# Patient Record
Sex: MALE | Race: WHITE | NOT HISPANIC OR LATINO | URBAN - METROPOLITAN AREA
[De-identification: names, ages, dates, MRNs, and addresses within clinical notes are randomized per-mention and may not be internally consistent; named-entity substitution may affect disease eponyms.]

---

## 2017-07-03 ENCOUNTER — IMPORTED ENCOUNTER (OUTPATIENT)
Dept: URBAN - METROPOLITAN AREA CLINIC 50 | Facility: CLINIC | Age: 67
End: 2017-07-03

## 2017-07-06 ENCOUNTER — IMPORTED ENCOUNTER (OUTPATIENT)
Dept: URBAN - METROPOLITAN AREA CLINIC 50 | Facility: CLINIC | Age: 67
End: 2017-07-06

## 2018-07-05 ENCOUNTER — IMPORTED ENCOUNTER (OUTPATIENT)
Dept: URBAN - METROPOLITAN AREA CLINIC 50 | Facility: CLINIC | Age: 68
End: 2018-07-05

## 2019-07-18 ENCOUNTER — IMPORTED ENCOUNTER (OUTPATIENT)
Dept: URBAN - METROPOLITAN AREA CLINIC 50 | Facility: CLINIC | Age: 69
End: 2019-07-18

## 2020-07-14 ENCOUNTER — IMPORTED ENCOUNTER (OUTPATIENT)
Dept: URBAN - METROPOLITAN AREA CLINIC 50 | Facility: CLINIC | Age: 70
End: 2020-07-14

## 2021-04-16 ASSESSMENT — VISUAL ACUITY
OD_CC: J2
OS_CC: J1@ 16 IN
OS_CC: J2
OD_SC: 20/20
OD_SC: 20/20
OD_BAT: 20/40
OS_BAT: 20/40
OD_SC: 20/20
OS_OTHER: 20/40. 20/50.
OD_CC: J1@ 16 IN
OD_CC: J2
OS_SC: 20/20
OS_CC: J2
OD_BAT: 20/30
OD_OTHER: 20/30. 20/40.
OD_CC: J1
OS_SC: 20/20
OD_OTHER: 20/40. 20/80.
OD_SC: 20/20
OS_CC: J1
OS_SC: 20/25
OS_BAT: 20/50
OS_OTHER: 20/50. 20/100.
OS_SC: 20/20

## 2021-04-16 ASSESSMENT — TONOMETRY
OD_IOP_MMHG: 16
OS_IOP_MMHG: 16
OD_IOP_MMHG: 16
OS_IOP_MMHG: 18
OS_IOP_MMHG: 16
OD_IOP_MMHG: 16
OS_IOP_MMHG: 17
OD_IOP_MMHG: 15

## 2021-06-24 NOTE — PATIENT DISCUSSION
Pt is bothered and symptomatic. Recommended CE to improve visual function. Discussed procedure, risks, benefits, and lens options. Pt wishes to proceed. Schedule A-scan and consult.

## 2021-06-28 NOTE — PATIENT DISCUSSION
IF patient does not notice improvement with CL OD and is still bothered by vision, patient may consider to proceed with cataract surgery OS to maximize vision.

## 2021-06-28 NOTE — PATIENT DISCUSSION
Recommend records release. Patient was last seen ~3 years ago elsewhere. Will look into crosslinking to see if this may be of benefit to patient.

## 2021-06-28 NOTE — PATIENT DISCUSSION
Patient may consider contact lenses to see if he is able to get best vision possible. Patient is mostly bothered by vision OD. Recommend CL OD first if continues bothersome, patient may consider cataract surgery OS to try to maximize vision.

## 2021-06-28 NOTE — PATIENT DISCUSSION
Hiral Archer spoke to patient over the phone regarding corneal crosslinking. Patient to see Dr. Lan Martin for consideration of crosslinking and can follow up in [place] to fit CL.

## 2021-06-28 NOTE — PATIENT DISCUSSION
Patient advised that corneal condition may limit visual recovery following cataract surgery and a partial thickness corneal transplant may be necessary in the future if corneal edema becomes visually significant.

## 2021-07-16 ENCOUNTER — PREPPED CHART (OUTPATIENT)
Dept: URBAN - METROPOLITAN AREA CLINIC 53 | Facility: CLINIC | Age: 71
End: 2021-07-16

## 2021-07-20 ENCOUNTER — COMPREHENSIVE EXAM (OUTPATIENT)
Dept: URBAN - METROPOLITAN AREA CLINIC 53 | Facility: CLINIC | Age: 71
End: 2021-07-20

## 2021-07-20 DIAGNOSIS — H35.373: ICD-10-CM

## 2021-07-20 DIAGNOSIS — H25.13: ICD-10-CM

## 2021-07-20 DIAGNOSIS — E11.9: ICD-10-CM

## 2021-07-20 PROCEDURE — 92014 COMPRE OPH EXAM EST PT 1/>: CPT

## 2021-07-20 PROCEDURE — 92134 CPTRZ OPH DX IMG PST SGM RTA: CPT

## 2021-07-20 ASSESSMENT — VISUAL ACUITY
OD_GLARE: 20/30
OS_GLARE: 20/40
OD_GLARE: 20/30
OS_GLARE: 20/50
OD_SC: 20/20
OU_SC: 20/20
OS_SC: 20/20

## 2021-07-20 ASSESSMENT — TONOMETRY
OS_IOP_MMHG: 16
OD_IOP_MMHG: 16

## 2021-07-27 NOTE — PATIENT DISCUSSION
Kandis Christopher spoke to patient over the phone regarding corneal crosslinking. Patient to see Dr. Margaret Mclean for consideration of crosslinking and can follow up in [place] to fit CL.

## 2022-08-16 ENCOUNTER — COMPREHENSIVE EXAM (OUTPATIENT)
Dept: URBAN - METROPOLITAN AREA CLINIC 53 | Facility: CLINIC | Age: 72
End: 2022-08-16

## 2022-08-16 DIAGNOSIS — H04.123: ICD-10-CM

## 2022-08-16 DIAGNOSIS — H35.373: ICD-10-CM

## 2022-08-16 DIAGNOSIS — E11.9: ICD-10-CM

## 2022-08-16 DIAGNOSIS — H25.13: ICD-10-CM

## 2022-08-16 PROCEDURE — 92134 CPTRZ OPH DX IMG PST SGM RTA: CPT

## 2022-08-16 PROCEDURE — 92014 COMPRE OPH EXAM EST PT 1/>: CPT

## 2022-08-16 ASSESSMENT — VISUAL ACUITY
OU_SC: 20/25+2
OS_GLARE: 20/60
OS_PH: 20/25+2
OS_GLARE: 20/40
OD_GLARE: 20/50
OD_SC: 20/25-2
OD_GLARE: 20/30
OS_SC: 20/40+2

## 2022-08-16 ASSESSMENT — TONOMETRY
OD_IOP_MMHG: 13
OS_IOP_MMHG: 13

## 2022-11-15 NOTE — PATIENT DISCUSSION
MAC OCT performed .  Retinal tear and detachment warning symptoms reviewed and patient instructed to call immediately if increasing floaters, flashes, or decreasing peripheral vision.

## 2022-11-15 NOTE — PATIENT DISCUSSION
Pellucid marginal degeneration OD , Topography performed today shows more progression in astigmatism.

## 2022-11-15 NOTE — PATIENT DISCUSSION
Recommend corneal consult with Dr. Jessee Caruso . Will look into crosslinking to see if this may be of benefit to patient.

## 2023-01-30 ENCOUNTER — PRE-OP/H&P (OUTPATIENT)
Dept: URBAN - METROPOLITAN AREA CLINIC 53 | Facility: CLINIC | Age: 73
End: 2023-01-30

## 2023-01-30 DIAGNOSIS — H25.13: ICD-10-CM

## 2023-01-30 DIAGNOSIS — H35.373: ICD-10-CM

## 2023-01-30 PROCEDURE — 92134 CPTRZ OPH DX IMG PST SGM RTA: CPT

## 2023-01-30 PROCEDURE — 92136 OPHTHALMIC BIOMETRY: CPT

## 2023-01-30 PROCEDURE — 92025AV CORNEAL TOPOGRAPHY, AV

## 2023-01-30 PROCEDURE — PREOP PRE OP VISIT

## 2023-01-30 ASSESSMENT — VISUAL ACUITY
OS_SC: 20/40+2
OD_SC: 20/30

## 2023-01-30 ASSESSMENT — TONOMETRY
OS_IOP_MMHG: 16
OD_IOP_MMHG: 18

## 2023-01-30 ASSESSMENT — KERATOMETRY
OS_K2POWER_DIOPTERS: 41.62
OD_AXISANGLE2_DEGREES: 97
OS_K1POWER_DIOPTERS: 41.62
OD_K1POWER_DIOPTERS: 43.78
OD_K2POWER_DIOPTERS: 41.37
OS_AXISANGLE_DEGREES: 108
OS_AXISANGLE2_DEGREES: 18
OD_AXISANGLE_DEGREES: 007

## 2023-02-08 PROBLEM — Z96.1: Noted: 2023-02-08

## 2023-02-08 PROBLEM — Z96.1: Noted: 2023-02-22

## 2023-06-05 ENCOUNTER — FOLLOW UP (OUTPATIENT)
Dept: URBAN - METROPOLITAN AREA CLINIC 53 | Facility: CLINIC | Age: 73
End: 2023-06-05

## 2023-06-05 DIAGNOSIS — H04.123: ICD-10-CM

## 2023-06-05 DIAGNOSIS — H35.373: ICD-10-CM

## 2023-06-05 PROCEDURE — 92134 CPTRZ OPH DX IMG PST SGM RTA: CPT

## 2023-06-05 PROCEDURE — 92012 INTRM OPH EXAM EST PATIENT: CPT

## 2023-06-05 ASSESSMENT — KERATOMETRY
OD_K1POWER_DIOPTERS: 43.78
OD_AXISANGLE2_DEGREES: 97
OD_K2POWER_DIOPTERS: 41.37
OS_K2POWER_DIOPTERS: 41.62
OD_AXISANGLE_DEGREES: 007
OS_K1POWER_DIOPTERS: 41.62
OS_AXISANGLE_DEGREES: 108
OS_AXISANGLE2_DEGREES: 18

## 2023-06-05 ASSESSMENT — VISUAL ACUITY
OS_PH: 20/25
OD_SC: 20/25
OS_SC: 20/30

## 2023-06-05 ASSESSMENT — TONOMETRY
OS_IOP_MMHG: 14
OD_IOP_MMHG: 13

## 2023-09-11 ENCOUNTER — COMPREHENSIVE EXAM (OUTPATIENT)
Dept: URBAN - METROPOLITAN AREA CLINIC 53 | Facility: CLINIC | Age: 73
End: 2023-09-11

## 2023-09-11 DIAGNOSIS — H26.493: ICD-10-CM

## 2023-09-11 PROCEDURE — 92014 COMPRE OPH EXAM EST PT 1/>: CPT

## 2023-09-11 PROCEDURE — 66821 AFTER CATARACT LASER SURGERY: CPT

## 2023-09-11 ASSESSMENT — KERATOMETRY
OS_K1POWER_DIOPTERS: 41.62
OD_AXISANGLE_DEGREES: 007
OS_AXISANGLE_DEGREES: 108
OS_AXISANGLE2_DEGREES: 18
OS_K2POWER_DIOPTERS: 41.62
OD_K2POWER_DIOPTERS: 41.37
OD_AXISANGLE2_DEGREES: 97
OD_K1POWER_DIOPTERS: 43.78

## 2023-09-11 ASSESSMENT — VISUAL ACUITY
OD_SC: 20/25-1
OD_GLARE: 20/30
OS_GLARE: 20/40
OD_GLARE: 20/40
OU_SC: J1+@16"
OS_PH: 20/25-2
OS_SC: 20/30
OS_GLARE: 20/40

## 2023-09-11 ASSESSMENT — TONOMETRY
OD_IOP_MMHG: 14
OS_IOP_MMHG: 14

## 2023-09-18 ENCOUNTER — CLINIC PROCEDURE ONLY (OUTPATIENT)
Dept: URBAN - METROPOLITAN AREA CLINIC 53 | Facility: CLINIC | Age: 73
End: 2023-09-18

## 2023-09-18 DIAGNOSIS — H26.492: ICD-10-CM

## 2023-09-18 PROCEDURE — 66821 AFTER CATARACT LASER SURGERY: CPT

## 2023-09-18 ASSESSMENT — VISUAL ACUITY
OD_SC: 20/25+2
OS_GLARE: 20/40
OS_SC: 20/30
OS_GLARE: 20/40
OS_PH: 20/25

## 2023-09-18 ASSESSMENT — TONOMETRY
OS_IOP_MMHG: 15
OD_IOP_MMHG: 15

## 2023-10-23 ENCOUNTER — POST-OP (OUTPATIENT)
Dept: URBAN - METROPOLITAN AREA CLINIC 53 | Facility: CLINIC | Age: 73
End: 2023-10-23

## 2023-10-23 DIAGNOSIS — E11.9: ICD-10-CM

## 2023-10-23 DIAGNOSIS — Z98.890: ICD-10-CM

## 2023-10-23 PROCEDURE — 99024 POSTOP FOLLOW-UP VISIT: CPT

## 2023-10-23 ASSESSMENT — TONOMETRY
OS_IOP_MMHG: 14
OD_IOP_MMHG: 13

## 2023-10-23 ASSESSMENT — VISUAL ACUITY
OU_SC: 20/20
OD_SC: 20/20
OS_SC: 20/25

## 2024-11-04 ENCOUNTER — COMPREHENSIVE EXAM (OUTPATIENT)
Dept: URBAN - METROPOLITAN AREA CLINIC 53 | Facility: CLINIC | Age: 74
End: 2024-11-04

## 2024-11-04 DIAGNOSIS — H04.123: ICD-10-CM

## 2024-11-04 DIAGNOSIS — E11.9: ICD-10-CM

## 2024-11-04 DIAGNOSIS — H35.373: ICD-10-CM

## 2024-11-04 PROCEDURE — 92134 CPTRZ OPH DX IMG PST SGM RTA: CPT | Mod: NC

## 2024-11-04 PROCEDURE — 99214 OFFICE O/P EST MOD 30 MIN: CPT
